# Patient Record
Sex: FEMALE | Race: WHITE
[De-identification: names, ages, dates, MRNs, and addresses within clinical notes are randomized per-mention and may not be internally consistent; named-entity substitution may affect disease eponyms.]

---

## 2017-03-11 ENCOUNTER — HOSPITAL ENCOUNTER (EMERGENCY)
Dept: HOSPITAL 72 - EMR | Age: 37
Discharge: HOME | End: 2017-03-11
Payer: MEDICAID

## 2017-03-11 VITALS — DIASTOLIC BLOOD PRESSURE: 72 MMHG | SYSTOLIC BLOOD PRESSURE: 119 MMHG

## 2017-03-11 VITALS — DIASTOLIC BLOOD PRESSURE: 75 MMHG | SYSTOLIC BLOOD PRESSURE: 123 MMHG

## 2017-03-11 VITALS — SYSTOLIC BLOOD PRESSURE: 123 MMHG | DIASTOLIC BLOOD PRESSURE: 75 MMHG

## 2017-03-11 VITALS — WEIGHT: 115 LBS | BODY MASS INDEX: 19.16 KG/M2 | HEIGHT: 65 IN

## 2017-03-11 DIAGNOSIS — R79.1: Primary | ICD-10-CM

## 2017-03-11 DIAGNOSIS — G40.909: ICD-10-CM

## 2017-03-11 LAB
ALBUMIN/GLOB SERPL: 1.4 {RATIO} (ref 1–2.7)
ALT SERPL-CCNC: 7 U/L (ref 3–33)
AMORPH SED URNS QL MICRO: (no result) /LPF
ANION GAP SERPL CALC-SCNC: 15 MMOL/L (ref 5–15)
APAP SERPL-MCNC: < 10 UG/ML (ref 10–30)
APPEARANCE UR: CLEAR
AST SERPL-CCNC: 16 U/L (ref 5–40)
BACTERIA #/AREA URNS HPF: (no result) /HPF
BASOPHILS NFR BLD AUTO: 1.1 % (ref 0–2)
CALCIUM SERPL-MCNC: 9.2 MG/DL (ref 8.6–10.2)
CHLORIDE SERPL-SCNC: 102 MEQ/L (ref 98–107)
CO2 SERPL-SCNC: 25 MEQ/L (ref 20–30)
CREAT SERPL-MCNC: 0.8 MG/DL (ref 0.5–0.9)
EOSINOPHIL NFR BLD AUTO: 1.2 % (ref 0–3)
ERYTHROCYTE [DISTWIDTH] IN BLOOD BY AUTOMATED COUNT: 10.4 % (ref 11.6–14.8)
ETHANOL SERPL-MCNC: < 10 MG/DL
GFR SERPLBLD BASED ON 1.73 SQ M-ARVRAT: > 60 ML/MIN (ref 60–?)
GLOBULIN SER-MCNC: 2.9 G/DL
HEMOLYSIS: 62
KETONES UR QL STRIP: (no result)
LEUKOCYTE ESTERASE UR QL STRIP: NEGATIVE
LYMPHOCYTES NFR BLD AUTO: 24.1 % (ref 20–45)
MCH RBC QN AUTO: 32.1 PG (ref 27–31)
MCHC RBC AUTO-ENTMCNC: 34.6 G/DL (ref 32–36)
MCV RBC AUTO: 93 FL (ref 80–99)
MONOCYTES NFR BLD AUTO: 7.8 % (ref 1–10)
NEUTROPHILS NFR BLD AUTO: 65.8 % (ref 45–75)
NITRITE UR QL STRIP: NEGATIVE
PH UR STRIP: 8 [PH] (ref 4.5–8)
PLATELET # BLD: 233 K/UL (ref 150–450)
PMV BLD AUTO: 8.8 FL (ref 6.5–10.1)
POTASSIUM SERPL-SCNC: 3.7 MEQ/L (ref 3.4–4.9)
PROT SERPL-MCNC: 7.2 G/DL (ref 6.6–8.7)
PROT UR QL STRIP: NEGATIVE
RBC # BLD AUTO: 4.21 M/UL (ref 4.2–5.4)
RBC #/AREA URNS HPF: (no result) /HPF (ref 0–2)
SODIUM SERPL-SCNC: 142 MEQ/L (ref 135–145)
SP GR UR STRIP: 1.01 (ref 1–1.03)
SQUAMOUS #/AREA URNS LPF: (no result) /LPF
UROBILINOGEN UR-MCNC: NORMAL MG/DL (ref 0–1)
WBC # BLD AUTO: 6.7 K/UL (ref 4.8–10.8)
WBC #/AREA URNS HPF: (no result) /HPF (ref 0–2)

## 2017-03-11 PROCEDURE — 80329 ANALGESICS NON-OPIOID 1 OR 2: CPT

## 2017-03-11 PROCEDURE — 81003 URINALYSIS AUTO W/O SCOPE: CPT

## 2017-03-11 PROCEDURE — 80053 COMPREHEN METABOLIC PANEL: CPT

## 2017-03-11 PROCEDURE — 81025 URINE PREGNANCY TEST: CPT

## 2017-03-11 PROCEDURE — 96360 HYDRATION IV INFUSION INIT: CPT

## 2017-03-11 PROCEDURE — 85025 COMPLETE CBC W/AUTO DIFF WBC: CPT

## 2017-03-11 PROCEDURE — 99284 EMERGENCY DEPT VISIT MOD MDM: CPT

## 2017-03-11 PROCEDURE — 80185 ASSAY OF PHENYTOIN TOTAL: CPT

## 2017-03-11 PROCEDURE — 80300: CPT

## 2017-03-11 PROCEDURE — 36415 COLL VENOUS BLD VENIPUNCTURE: CPT

## 2017-03-11 NOTE — EMERGENCY ROOM REPORT
History of Present Illness


General


Chief Complaint:  General Complaint


Source:  Patient





Present Illness


HPI


36-year-old female presents ED for evaluation.  States that she feels like she 

is about to have a seizure.  Feels an "aura".  Denies having seizure today.  

Denies photophobia, dizziness, headache.  Notes history of seizures-takes 

Dilantin.  States she is compliant with her medications.  States she took her 

medication this morning.  Denies alcohol or drug use.  No other aggravating or 

relieving factors.  Denies any other associated symptoms


Allergies:  


Coded Allergies:  


     No Known Allergies (Unverified , 10/21/15)





Patient History


Past Medical History:  none


Past Surgical History:  none


Pertinent Family History:  none


Social History:  Denies: alcohol use, drug use, smoking


Last Menstrual Period:  on period


Pregnant Now:  No


Immunizations:  UTD


Reviewed Nursing Documentation:  PMH: Agreed, PSxH: Agreed





Nursing Documentation-PMH


Past Medical History:  No History, Except For


Hx Seizures:  Yes





Review of Systems


All Other Systems:  negative except mentioned in HPI





Physical Exam





Vital Signs








  Date Time  Temp Pulse Resp B/P Pulse Ox O2 Delivery O2 Flow Rate FiO2


 


3/11/17 14:31 98.1 85 16 128/69 100 Room Air  








Sp02 EP Interpretation:  reviewed, normal


General Appearance:  no apparent distress, alert, GCS 15, non-toxic


Head:  normocephalic, atraumatic


Eyes:  bilateral eye PERRL, bilateral eye normal inspection


ENT:  hearing grossly normal, normal pharynx, no angioedema, normal voice


Neck:  full range of motion, supple/symm/no masses


Respiratory:  chest non-tender, lungs clear, normal breath sounds, speaking 

full sentences


Cardiovascular #1:  regular rate, rhythm, no edema


Cardiovascular #2:  2+ carotid (R), 2+ carotid (L), 2+ radial (R), 2+ radial (L)

, 2+ dorsalis pedis (R), 2+ dorsalis pedis (L)


Gastrointestinal:  normal bowel sounds, non tender, soft, non-distended, no 

guarding, no rebound


Rectal:  deferred


Genitourinary:  normal inspection, no CVA tenderness


Musculoskeletal:  back normal, gait/station normal, normal range of motion, non-

tender


Neurologic:  alert, oriented x3, responsive, motor strength/tone normal, 

sensory intact, speech normal


Psychiatric:  judgement/insight normal, memory normal, mood/affect normal, no 

suicidal/homicidal ideation


Reflexes:  3+ bicep (R), 3+ bicep (L), 3+ tricep (R), 3+ tricep (L), 3+ knee (R)

, 3+ knee (L)


Skin:  normal color, no rash, warm/dry, well hydrated


Lymphatic:  no adenopathy





Medical Decision Making


Diagnostic Impression:  


 Primary Impression:  


 Subtherapeutic serum dilantin level


ER Course


Hospital Course 





36-year-old female present to ED feeling "an aura".  History of seizures-take 

Dilantin





Differential diagnosis includes- breakthrough seizure, alcohol abuse, 

noncompliance with medication





Clinical course


Patient placed on stretcher.  Initial history and physical I ordered labs, IV 

fluids





Labs-electrolytes okay, no leukocytosis, hemoglobin/hematocrit stable.  

Dilantin level subtherapeutic 





Discussed findings with patient.  Patient insists that she is taking the 

medication.  States that she used to be on oral capsules but was recently 

switched to chewable tablets.  Patient states she has been feeling the symptoms 

since being switched to the chewable tablets.





Given oral loading dose in ED.  I will prescribe oral capsules





Diagnosis - subtherapeutic Dilantin level





stable and discharged to home with prescription for Dilantin.  Followup with 

PMD.  Return to ED if symptoms recur or worsen





Labs








Test


  3/11/17


15:35


 


White Blood Count


  6.7 K/UL


(4.8-10.8)


 


Red Blood Count


  4.21 M/UL


(4.20-5.40)


 


Hemoglobin


  13.5 G/DL


(12.0-16.0)


 


Hematocrit


  39.0 %


(37.0-47.0)


 


Mean Corpuscular Volume 93 FL (80-99) 


 


Mean Corpuscular Hemoglobin


  32.1 PG


(27.0-31.0)


 


Mean Corpuscular Hemoglobin


Concent 34.6 G/DL


(32.0-36.0)


 


Red Cell Distribution Width


  10.4 %


(11.6-14.8)


 


Platelet Count


  233 K/UL


(150-450)


 


Mean Platelet Volume


  8.8 FL


(6.5-10.1)


 


Neutrophils (%) (Auto)


  65.8 %


(45.0-75.0)


 


Lymphocytes (%) (Auto)


  24.1 %


(20.0-45.0)


 


Monocytes (%) (Auto)


  7.8 %


(1.0-10.0)


 


Eosinophils (%) (Auto)


  1.2 %


(0.0-3.0)


 


Basophils (%) (Auto)


  1.1 %


(0.0-2.0)


 


Urine Color Pale yellow 


 


Urine Appearance Clear 


 


Urine pH 8 (4.5-8.0) 


 


Urine Specific Gravity


  1.010


(1.005-1.035)


 


Urine Protein


  Negative


(NEGATIVE)


 


Urine Glucose (UA)


  Negative


(NEGATIVE)


 


Urine Ketones 1+ (NEGATIVE) 


 


Urine Occult Blood 4+ (NEGATIVE) 


 


Urine Nitrite


  Negative


(NEGATIVE)


 


Urine Bilirubin


  Negative


(NEGATIVE)


 


Urine Urobilinogen


  Normal MG/DL


(0.0-1.0)


 


Urine Leukocyte Esterase


  Negative


(NEGATIVE)


 


Urine RBC


  5-10 /HPF (0 -


2)


 


Urine WBC


  2-4 /HPF (0 -


2)


 


Urine Squamous Epithelial


Cells Few /LPF


(NONE/OCC)


 


Urine Amorphous Sediment


  Few /LPF


(NONE)


 


Urine Bacteria


  Few /HPF


(NONE)


 


Urine HCG, Qualitative Negative 


 


Sodium Level


  142 mEQ/L


(135-145)


 


Potassium Level


  3.7 mEQ/L


(3.4-4.9)


 


Chloride Level


  102 mEQ/L


()


 


Carbon Dioxide Level


  25 mEQ/L


(20-30)


 


Anion Gap 15 (5-15) 


 


Blood Urea Nitrogen 7 mg/dL (7-23) 


 


Creatinine


  0.8 mg/dL


(0.5-0.9)


 


Estimat Glomerular Filtration


Rate > 60 mL/min


(>60)


 


Glucose Level


  106 mg/dL


()


 


Calcium Level


  9.2 mg/dL


(8.6-10.2)


 


Total Bilirubin


  0.3 mg/dL


(0.0-1.2)


 


Aspartate Amino Transf


(AST/SGOT) 16 U/L (5-40) 


 


 


Alanine Aminotransferase


(ALT/SGPT) 7 U/L (3-33) 


 


 


Alkaline Phosphatase


  49 U/L


()


 


Total Protein


  7.2 g/dL


(6.6-8.7)


 


Albumin


  4.3 g/dL


(3.5-5.2)


 


Globulin 2.9 g/dL 


 


Albumin/Globulin Ratio 1.4 (1.0-2.7) 


 


Urine Opiates Screen


  Negative


(NEGATIVE)


 


Acetaminophen Level


  < 10 ug/mL


(10-30)


 


Urine Barbiturates Screen


  Negative


(NEGATIVE)


 


Phenytoin (Dilantin) Level


  < 0.8 ug/mL


(10-20)


 


Phencyclidine (PCP) Screen


  Negative


(NEGATIVE)


 


Urine Amphetamines Screen


  Negative


(NEGATIVE)


 


Urine Benzodiazepines Screen


  Negative


(NEGATIVE)


 


Urine Cocaine Screen


  Negative


(NEGATIVE)


 


Urine Marijuana (THC) Screen


  Negative


(NEGATIVE)


 


Serum Alcohol < 10 mg/dL 











Last Vital Signs








  Date Time  Temp Pulse Resp B/P Pulse Ox O2 Delivery O2 Flow Rate FiO2


 


3/11/17 14:31 98.1 85 16 128/69 100 Room Air  








Status:  improved


Disposition:  HOME, SELF-CARE


Condition:  Stable


Scripts


Phenytoin Sodium Extended* (DILANTIN*) 100 Mg Capsule


100 MG ORAL THREE TIMES A DAY, #90 CAP 0 Refills


   Prov: GURINDER PONCE M.D.         3/11/17











GURINDER PONCE M.D. Mar 11, 2017 15:46

## 2018-03-26 ENCOUNTER — HOSPITAL ENCOUNTER (EMERGENCY)
Dept: HOSPITAL 72 - EMR | Age: 38
Discharge: HOME | End: 2018-03-26
Payer: MEDICAID

## 2018-03-26 VITALS — DIASTOLIC BLOOD PRESSURE: 71 MMHG | SYSTOLIC BLOOD PRESSURE: 111 MMHG

## 2018-03-26 VITALS — HEIGHT: 65 IN | WEIGHT: 110 LBS | BODY MASS INDEX: 18.33 KG/M2

## 2018-03-26 VITALS — SYSTOLIC BLOOD PRESSURE: 102 MMHG | DIASTOLIC BLOOD PRESSURE: 65 MMHG

## 2018-03-26 DIAGNOSIS — R07.81: Primary | ICD-10-CM

## 2018-03-26 PROCEDURE — 99283 EMERGENCY DEPT VISIT LOW MDM: CPT

## 2018-03-26 PROCEDURE — 71045 X-RAY EXAM CHEST 1 VIEW: CPT

## 2018-03-26 NOTE — DIAGNOSTIC IMAGING REPORT
Indication: Chest pain

 

Technique: One view of the chest

 

Comparison: 11/12/2016

 

Findings: Lungs and pleural spaces are clear. Heart size is normal. No significant

interim change

 

Impression: No acute process

## 2018-03-26 NOTE — EMERGENCY ROOM REPORT
History of Present Illness


General


Chief Complaint:  Pain


Source:  Patient





Present Illness


HPI


37-year-old female presents ED for evaluation.  Patient complaining of left-

sided rib pain which started one week ago.  Denies trauma.  Pain is sharp, 5 

out of 10, nonradiating.  Localized left lower ribs.  Denies shortness of 

breath.  Denies chest pain.  Denies cough.  No other aggravating relieving 

factors.  Denies any other associated symptoms


Allergies:  


Coded Allergies:  


     No Known Allergies (Unverified , 10/21/15)





Patient History


Past Medical History:  seizures


Past Surgical History:  none


Pertinent Family History:  none


Social History:  Denies: smoking, alcohol use, drug use


Last Menstrual Period:  lmp 2 weeks


Pregnant Now:  No


Immunizations:  UTD


Reviewed Nursing Documentation:  PMH: Agreed; PSxH: Agreed





Nursing Documentation-PMH


Past Medical History:  No History, Except For


Hx Seizures:  Yes





Review of Systems


All Other Systems:  negative except mentioned in HPI





Physical Exam





Vital Signs








  Date Time  Temp Pulse Resp B/P (MAP) Pulse Ox O2 Delivery O2 Flow Rate FiO2


 


3/26/18 12:14 98.0 88 18 102/65 98 Room Air  





 98.1       








Sp02 EP Interpretation:  reviewed, normal


General Appearance:  no apparent distress, alert, GCS 15, non-toxic


Head:  normocephalic, atraumatic


Eyes:  bilateral eye normal inspection, bilateral eye PERRL


ENT:  hearing grossly normal, normal pharynx, no angioedema, normal voice


Neck:  full range of motion, supple/symm/no masses


Respiratory:  lungs clear, normal breath sounds, speaking full sentences, other 

- reproducible L lateral lower rib pain. no crepitus/bruising


Cardiovascular #1:  regular rate, rhythm, no edema


Cardiovascular #2:  2+ carotid (R), 2+ carotid (L), 2+ radial (R), 2+ radial (L)

, 2+ dorsalis pedis (R), 2+ dorsalis pedis (L)


Gastrointestinal:  normal bowel sounds, non tender, soft, non-distended, no 

guarding, no rebound


Rectal:  deferred


Genitourinary:  normal inspection, no CVA tenderness


Musculoskeletal:  back normal, gait/station normal, normal range of motion, non-

tender


Neurologic:  alert, oriented x3, responsive, motor strength/tone normal, 

sensory intact, speech normal


Psychiatric:  judgement/insight normal, memory normal, mood/affect normal, no 

suicidal/homicidal ideation


Reflexes:  3+ bicep (R), 3+ bicep (L), 3+ tricep (R), 3+ tricep (L), 3+ knee (R)

, 3+ knee (L)


Skin:  normal color, no rash, warm/dry, well hydrated


Lymphatic:  no adenopathy





Medical Decision Making


Diagnostic Impression:  


 Primary Impression:  


 Rib pain on left side


ER Course


Hospital Course 


36 yo F presents to ED c/o L rib pain. reported no trauma





Differential diagnoses include: Fracture, contusion, PTX





Clinical course


Patient placed on stretcher.  After initial history and physical, I ordered CXR





CXR shows no acute fx, no PTX.  discussed findings with patient. reassurance 

given





Diagnosis - rib pain on left side





Stable and discharged to home with prescription for Motrin.  weight bear as 

tolerated.  Followup with PMD.  Return to ED if symptoms recur or worsen


Chest X-Ray Diagnostic Results


Chest X-Ray Diagnostic Results :  


   Chest X-Ray Ordered:  Yes


   # of Views/Limited/Complete:  1 View


   Indication:  Chest Pain


   EP Interpretation:  Yes


   Interpretation:  no consolidation, no effusion, no pneumothorax, no acute 

cardiopulmonary disease


   Impression:  No acute disease


   Electronically Signed by:  Electronically signed by Earnest Guerrero MD





Last Vital Signs








  Date Time  Temp Pulse Resp B/P (MAP) Pulse Ox O2 Delivery O2 Flow Rate FiO2


 


3/26/18 13:29 97.7 79 18 111/71 98 Room Air  





 98.1       








Status:  improved


Disposition:  HOME, SELF-CARE


Condition:  Stable


Scripts


Ibuprofen* (MOTRIN*) 600 Mg Tablet


600 MG ORAL Q8H PRN for For Pain, #30 TAB 0 Refills


   Prov: EARNEST GUERRERO M.D.         3/26/18


Patient Instructions:  Rib Contusion











EARNEST GUERRERO M.D. Mar 26, 2018 14:02